# Patient Record
Sex: FEMALE | Race: BLACK OR AFRICAN AMERICAN | NOT HISPANIC OR LATINO | Employment: OTHER | URBAN - METROPOLITAN AREA
[De-identification: names, ages, dates, MRNs, and addresses within clinical notes are randomized per-mention and may not be internally consistent; named-entity substitution may affect disease eponyms.]

---

## 2018-05-03 ENCOUNTER — CONSULTATION (OUTPATIENT)
Dept: URBAN - METROPOLITAN AREA CLINIC 11 | Facility: CLINIC | Age: 73
End: 2018-05-03

## 2018-05-03 VITALS — DIASTOLIC BLOOD PRESSURE: 68 MMHG | SYSTOLIC BLOOD PRESSURE: 118 MMHG | HEIGHT: 55 IN

## 2018-05-03 ASSESSMENT — VISUAL ACUITY
OD_CC: 20/70-1
OS_PH: 20/25
OD_PH: 20/25-1
OS_CC: 20/70-1

## 2018-05-03 ASSESSMENT — TONOMETRY
OD_IOP_MMHG: 16
OS_IOP_MMHG: 17

## 2020-02-20 ENCOUNTER — IMPORTED ENCOUNTER (OUTPATIENT)
Dept: URBAN - METROPOLITAN AREA CLINIC 9 | Facility: CLINIC | Age: 75
End: 2020-02-20

## 2021-08-19 ENCOUNTER — IMPORTED ENCOUNTER (OUTPATIENT)
Dept: URBAN - METROPOLITAN AREA CLINIC 9 | Facility: CLINIC | Age: 76
End: 2021-08-19

## 2021-08-19 PROBLEM — H04.123: Noted: 2021-08-19

## 2021-08-19 PROBLEM — E11.9: Noted: 2021-08-19

## 2021-08-19 PROBLEM — H40.023: Noted: 2021-08-19

## 2021-08-19 PROBLEM — H25.12: Noted: 2021-08-19

## 2021-08-19 PROBLEM — H25.13: Noted: 2021-08-19

## 2021-10-17 ASSESSMENT — VISUAL ACUITY
OD_CC: 20/60 SN
OD_CC: 20/60 SN
OS_CC: 20/50 -2 SN
OS_CC: 20/50 -2 SN
OD_CC: 20/70 SN
OD_PH: 20/50 - SN
OS_CC: 20/80 SN

## 2021-10-17 ASSESSMENT — TONOMETRY
OS_IOP_MMHG: 14
OS_IOP_MMHG: 15
OD_IOP_MMHG: 13
OD_IOP_MMHG: 14

## 2021-10-28 ENCOUNTER — PRE-OP/H&P (OUTPATIENT)
Dept: URBAN - METROPOLITAN AREA CLINIC 12 | Facility: CLINIC | Age: 76
End: 2021-10-28

## 2021-10-28 DIAGNOSIS — H25.13: ICD-10-CM

## 2021-10-28 PROCEDURE — 92136 OPHTHALMIC BIOMETRY: CPT

## 2022-03-10 NOTE — PATIENT DISCUSSION
Recommended observation. You can access the FollowMyHealth Patient Portal offered by [facility] by registering at the following website: [email or web address]. By joining [facility]’s FollowMyHealth portal, you will also be able to view your health information using other applications (apps) compatible with our [email or web address]

## 2022-06-19 RX ORDER — DEXLANSOPRAZOLE 60 MG/1
1 CAPSULE, DELAYED RELEASE ORAL
COMMUNITY

## 2022-06-19 RX ORDER — FERROUS SULFATE 325(65) MG
TABLET ORAL
COMMUNITY

## 2022-06-19 RX ORDER — GLIPIZIDE 5 MG/1
TABLET ORAL
COMMUNITY
End: 2022-07-10

## 2022-06-19 RX ORDER — LEVOTHYROXINE SODIUM 0.07 MG/1
TABLET ORAL
COMMUNITY

## 2022-06-19 RX ORDER — METOPROLOL SUCCINATE 25 MG/1
TABLET, EXTENDED RELEASE ORAL
COMMUNITY

## 2022-06-19 RX ORDER — GLIMEPIRIDE 1 MG/1
TABLET ORAL
COMMUNITY
End: 2022-07-10

## 2022-06-19 RX ORDER — CLOPIDOGREL BISULFATE 75 MG/1
TABLET ORAL
COMMUNITY

## 2022-06-28 RX ORDER — PRAVASTATIN SODIUM 20 MG
TABLET ORAL
COMMUNITY

## 2022-07-10 PROBLEM — N95.2 POSTMENOPAUSAL ATROPHIC VAGINITIS: Status: ACTIVE | Noted: 2022-07-10

## 2022-07-10 PROBLEM — E55.9 VITAMIN D DEFICIENCY: Status: ACTIVE | Noted: 2022-07-10

## 2022-07-10 PROBLEM — E03.9 HYPOTHYROIDISM: Status: ACTIVE | Noted: 2022-07-10

## 2022-07-10 PROBLEM — I10 ESSENTIAL HYPERTENSION: Status: ACTIVE | Noted: 2022-07-10

## 2022-07-10 PROBLEM — E87.6 HYPOPOTASSEMIA: Status: ACTIVE | Noted: 2022-07-10

## 2022-07-10 PROBLEM — E11.9 TYPE 2 DIABETES MELLITUS WITHOUT COMPLICATION (HCC): Status: ACTIVE | Noted: 2022-07-10

## 2022-07-10 PROBLEM — E78.5 HYPERLIPIDEMIA: Status: ACTIVE | Noted: 2022-07-10

## 2022-07-10 PROBLEM — N95.9 MENOPAUSAL AND POSTMENOPAUSAL DISORDER: Status: ACTIVE | Noted: 2022-07-10

## 2022-09-23 PROBLEM — S06.5XAA SUBDURAL HEMATOMA: Status: ACTIVE | Noted: 2022-09-23

## 2022-09-23 PROBLEM — I77.72 DISSECTION OF RIGHT ILIAC ARTERY (HCC): Status: ACTIVE | Noted: 2018-12-19

## 2022-09-23 PROBLEM — K86.2 PANCREATIC CYST: Status: ACTIVE | Noted: 2022-09-23

## 2022-09-23 PROBLEM — E05.90 HYPERTHYROIDISM: Status: ACTIVE | Noted: 2018-09-13

## 2022-09-23 PROBLEM — Z95.828 PRESENCE OF STENT IN ARTERY: Status: ACTIVE | Noted: 2018-09-13

## 2022-09-23 PROBLEM — J30.9 ALLERGIC RHINITIS: Status: ACTIVE | Noted: 2022-09-23

## 2022-09-23 PROBLEM — J44.9 COPD (CHRONIC OBSTRUCTIVE PULMONARY DISEASE) (HCC): Status: ACTIVE | Noted: 2022-09-23

## 2022-09-23 PROBLEM — E78.00 HYPERCHOLESTEROLEMIA: Status: ACTIVE | Noted: 2018-09-13

## 2022-09-23 PROBLEM — I10 HTN (HYPERTENSION): Status: ACTIVE | Noted: 2018-09-13

## 2022-09-23 PROBLEM — I71.40 AAA (ABDOMINAL AORTIC ANEURYSM): Status: ACTIVE | Noted: 2022-09-23

## 2022-10-03 ENCOUNTER — ESTABLISHED PATIENT (OUTPATIENT)
Dept: URBAN - METROPOLITAN AREA CLINIC 6 | Facility: CLINIC | Age: 77
End: 2022-10-03

## 2022-10-03 DIAGNOSIS — E11.9: ICD-10-CM

## 2022-10-03 DIAGNOSIS — H25.13: ICD-10-CM

## 2022-10-03 DIAGNOSIS — H40.023: ICD-10-CM

## 2022-10-03 PROCEDURE — 92133 CPTRZD OPH DX IMG PST SGM ON: CPT

## 2022-10-03 PROCEDURE — 92014 COMPRE OPH EXAM EST PT 1/>: CPT

## 2022-10-03 ASSESSMENT — TONOMETRY
OD_IOP_MMHG: 17
OS_IOP_MMHG: 18

## 2022-10-03 ASSESSMENT — VISUAL ACUITY
OS_PH: 20/40
OD_PH: 20/50
OS_CC: 20/70+1
OD_CC: 20/70-1

## 2023-05-25 ENCOUNTER — ESTABLISHED PATIENT (OUTPATIENT)
Dept: URBAN - METROPOLITAN AREA CLINIC 12 | Facility: CLINIC | Age: 78
End: 2023-05-25

## 2023-05-25 DIAGNOSIS — H04.123: ICD-10-CM

## 2023-05-25 DIAGNOSIS — H40.023: ICD-10-CM

## 2023-05-25 DIAGNOSIS — H25.13: ICD-10-CM

## 2023-05-25 DIAGNOSIS — H01.02A: ICD-10-CM

## 2023-05-25 PROCEDURE — 92012 INTRM OPH EXAM EST PATIENT: CPT

## 2023-05-25 ASSESSMENT — VISUAL ACUITY
OU_CC: 20/60-2
OS_PH: 20/50+2
OD_PH: 20/50-1
OS_CC: 20/100
OD_CC: 20/100-1

## 2023-05-25 ASSESSMENT — TONOMETRY
OS_IOP_MMHG: 12
OD_IOP_MMHG: 14

## 2023-10-26 ENCOUNTER — ESTABLISHED PATIENT (OUTPATIENT)
Dept: URBAN - METROPOLITAN AREA CLINIC 12 | Facility: CLINIC | Age: 78
End: 2023-10-26

## 2023-10-26 DIAGNOSIS — H01.02A: ICD-10-CM

## 2023-10-26 DIAGNOSIS — H04.123: ICD-10-CM

## 2023-10-26 DIAGNOSIS — H40.023: ICD-10-CM

## 2023-10-26 DIAGNOSIS — E11.9: ICD-10-CM

## 2023-10-26 DIAGNOSIS — H25.13: ICD-10-CM

## 2023-10-26 PROCEDURE — 92014 COMPRE OPH EXAM EST PT 1/>: CPT

## 2023-10-26 ASSESSMENT — VISUAL ACUITY
OU_CC: 20/50-2
OD_CC: 20/100+1
OS_CC: 20/50-2

## 2023-10-26 ASSESSMENT — TONOMETRY
OD_IOP_MMHG: 11
OS_IOP_MMHG: 12

## 2023-11-02 ENCOUNTER — POST-OP (OUTPATIENT)
Dept: URBAN - METROPOLITAN AREA CLINIC 10 | Facility: CLINIC | Age: 78
End: 2023-11-02

## 2023-11-02 DIAGNOSIS — H25.12: ICD-10-CM

## 2023-11-02 DIAGNOSIS — Z96.1: ICD-10-CM

## 2023-11-02 PROCEDURE — 99024 POSTOP FOLLOW-UP VISIT: CPT

## 2023-11-02 PROCEDURE — 92136 OPHTHALMIC BIOMETRY: CPT

## 2023-11-02 ASSESSMENT — VISUAL ACUITY: OD_SC: 20/20-2

## 2023-11-02 ASSESSMENT — TONOMETRY: OD_IOP_MMHG: 17

## 2023-11-30 ENCOUNTER — POST-OP (OUTPATIENT)
Dept: URBAN - METROPOLITAN AREA CLINIC 10 | Facility: CLINIC | Age: 78
End: 2023-11-30

## 2023-11-30 DIAGNOSIS — Z96.1: ICD-10-CM

## 2023-11-30 PROCEDURE — 99024 POSTOP FOLLOW-UP VISIT: CPT

## 2023-11-30 ASSESSMENT — TONOMETRY: OS_IOP_MMHG: 17

## 2023-11-30 ASSESSMENT — VISUAL ACUITY: OS_SC: 20/50
